# Patient Record
Sex: MALE | Race: AMERICAN INDIAN OR ALASKA NATIVE
[De-identification: names, ages, dates, MRNs, and addresses within clinical notes are randomized per-mention and may not be internally consistent; named-entity substitution may affect disease eponyms.]

---

## 2019-06-24 ENCOUNTER — HOSPITAL ENCOUNTER (EMERGENCY)
Dept: HOSPITAL 5 - ED | Age: 29
LOS: 1 days | Discharge: HOME | End: 2019-06-25
Payer: COMMERCIAL

## 2019-06-24 VITALS — DIASTOLIC BLOOD PRESSURE: 78 MMHG | SYSTOLIC BLOOD PRESSURE: 127 MMHG
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
## 2019-06-24 DIAGNOSIS — Z79.899: ICD-10-CM
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
## 2019-06-24 DIAGNOSIS — M25.511: Primary | ICD-10-CM

## 2019-06-24 PROCEDURE — 99283 EMERGENCY DEPT VISIT LOW MDM: CPT

## 2019-06-24 PROCEDURE — 72040 X-RAY EXAM NECK SPINE 2-3 VW: CPT

## 2019-06-24 PROCEDURE — 72100 X-RAY EXAM L-S SPINE 2/3 VWS: CPT

## 2019-06-24 NOTE — XRAY REPORT
PROCEDURE: XR SPINE CERVICAL 2-3V 

 

TECHNIQUE:  4 views of the cervical spine 

 

HISTORY: neck pain 

 

COMPARISONS: None  

 

FINDINGS:  

The bones are normally mineralized. The cervical vertebrae are normal in height and alignment. The di
sc spaces are well preserved. The soft tissues are unremarkable. 

 

There is no evidence of acute fracture or subluxation.  

 

IMPRESSION: 

Normal C-spine series 

 

This document is electronically signed by Laurie Mcclain MD., June 24 2019 10:48:39 PM ET
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
## 2019-06-24 NOTE — XRAY REPORT
PROCEDURE: XR SPINE LUMBOSACRAL 2-3V 

 

TECHNIQUE:  3 views of the lumbar spine 

 

HISTORY: back pain 

 

COMPARISONS: None  

 

FINDINGS: Lumbar vertebral bodies are normal in height and alignment. The disc spaces are well preser
carol. There is no evidence of acute fracture or subluxation. The soft tissues are unremarkable. 

 

IMPRESSION:  

Normal lumbar spine series 

 

This document is electronically signed by Laurie Mcclain MD., June 24 2019 10:51:08 PM ET
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
